# Patient Record
Sex: MALE | Race: BLACK OR AFRICAN AMERICAN | NOT HISPANIC OR LATINO | Employment: UNEMPLOYED | URBAN - METROPOLITAN AREA
[De-identification: names, ages, dates, MRNs, and addresses within clinical notes are randomized per-mention and may not be internally consistent; named-entity substitution may affect disease eponyms.]

---

## 2020-01-17 ENCOUNTER — HOSPITAL ENCOUNTER (EMERGENCY)
Facility: HOSPITAL | Age: 30
Discharge: HOME | End: 2020-01-18
Admitting: EMERGENCY MEDICINE

## 2020-01-17 DIAGNOSIS — M79.661 PAIN IN RIGHT LOWER LEG: Primary | ICD-10-CM

## 2020-01-17 PROCEDURE — 99283 EMERGENCY DEPT VISIT LOW MDM: CPT

## 2020-01-17 PROCEDURE — 80048 BASIC METABOLIC PNL TOTAL CA: CPT | Performed by: PHYSICIAN ASSISTANT

## 2020-01-17 PROCEDURE — 82550 ASSAY OF CK (CPK): CPT | Performed by: PHYSICIAN ASSISTANT

## 2020-01-17 RX ORDER — ACETAMINOPHEN 500 MG
1000 TABLET ORAL ONCE
Status: COMPLETED | OUTPATIENT
Start: 2020-01-17 | End: 2020-01-17

## 2020-01-17 RX ADMIN — ACETAMINOPHEN 1000 MG: 500 TABLET ORAL at 23:57

## 2020-01-18 VITALS
WEIGHT: 143.3 LBS | HEIGHT: 72 IN | SYSTOLIC BLOOD PRESSURE: 127 MMHG | HEART RATE: 74 BPM | OXYGEN SATURATION: 98 % | RESPIRATION RATE: 18 BRPM | BODY MASS INDEX: 19.41 KG/M2 | TEMPERATURE: 98.1 F | DIASTOLIC BLOOD PRESSURE: 72 MMHG

## 2020-01-18 LAB
ANION GAP SERPL CALCULATED.3IONS-SCNC: 10 MMOL/L (ref 5–15)
BUN BLD-MCNC: 13 MG/DL (ref 6–20)
BUN/CREAT SERPL: 9.6 (ref 7–25)
CALCIUM SPEC-SCNC: 9.1 MG/DL (ref 8.6–10.5)
CHLORIDE SERPL-SCNC: 106 MMOL/L (ref 98–107)
CK SERPL-CCNC: 369 U/L (ref 20–200)
CO2 SERPL-SCNC: 25 MMOL/L (ref 22–29)
CREAT BLD-MCNC: 1.36 MG/DL (ref 0.76–1.27)
GFR SERPL CREATININE-BSD FRML MDRD: 75 ML/MIN/1.73
GLUCOSE BLD-MCNC: 96 MG/DL (ref 65–99)
POTASSIUM BLD-SCNC: 3.5 MMOL/L (ref 3.5–5.2)
SODIUM BLD-SCNC: 141 MMOL/L (ref 136–145)

## 2020-01-18 NOTE — ED PROVIDER NOTES
Subjective   Patient is a 29-year-old -American male with no past medical history presents ER complaining of right lower leg pain for 1 day.  Patient states that he was installing a dryer at his house when he was standing to close the dryer outlet and felt a shock on his right lower leg.  Patient states that he did not have any pain at the time or throughout the day, however as the day persisted he started to feel some pain and intermittent numbness in his right lower leg.  Patient states that when he is walking his leg does not hurt him, however when he goes to kneel on the ground he starts feeling some numbness.  Patient states pain is mostly in right lower leg just below his right knee, described as a achy pain, occasionally radiates up to his knee and rates it a 3/10.  Patient denies any weakness, dropfoot, numbness and tingling in his toes.  Patient states he has taken no medicine to help with his pain today.      History provided by:  Patient      Review of Systems   Constitutional: Negative for fever.   HENT: Negative for sore throat and trouble swallowing.    Respiratory: Negative for shortness of breath and wheezing.    Cardiovascular: Negative for chest pain.   Gastrointestinal: Negative for abdominal pain.   Genitourinary: Negative for dysuria.   Musculoskeletal: Positive for arthralgias.        Right lower leg pain   Skin: Negative for rash.   Neurological: Positive for numbness. Negative for headaches.   Psychiatric/Behavioral: Negative for behavioral problems.   All other systems reviewed and are negative.      No past medical history on file.    No Known Allergies    No past surgical history on file.    No family history on file.    Social History     Socioeconomic History   • Marital status: Single     Spouse name: Not on file   • Number of children: Not on file   • Years of education: Not on file   • Highest education level: Not on file           Objective   Physical Exam   Constitutional: He  "is oriented to person, place, and time. He appears well-developed and well-nourished. No distress.   HENT:   Head: Normocephalic and atraumatic.   Eyes: Pupils are equal, round, and reactive to light. EOM are normal.   Cardiovascular: Normal rate, regular rhythm, normal heart sounds and intact distal pulses.   No murmur heard.  Pulmonary/Chest: Effort normal and breath sounds normal. He has no wheezes.   Musculoskeletal: Normal range of motion. He exhibits tenderness. He exhibits no edema or deformity.   Mild tenderness to palpation to the superior lateral aspect of the right lower leg  Sensation to soft touch intact  Sensory and motor strength 5/5 bilaterally  DP pulses present 2+ bilaterally  Negative Homans sign   Neurological: He is alert and oriented to person, place, and time. No sensory deficit. He exhibits normal muscle tone.   Skin: Skin is warm. Capillary refill takes less than 2 seconds. No rash noted. No erythema.   No overlying erythema, laceration, abrasion, burn, wound or break in the skin along the right lower leg  No signs of cellulitis, no ecchymosis, no streaking or signs of infection  No obvious swelling   Psychiatric: He has a normal mood and affect. His behavior is normal. Judgment and thought content normal.   Nursing note and vitals reviewed.      Procedures           ED Course    /72   Pulse 77   Temp 99 °F (37.2 °C) (Oral)   Resp 14   Ht 182.9 cm (72\")   Wt 65 kg (143 lb 4.8 oz)   SpO2 98%   BMI 19.43 kg/m²   Labs Reviewed   CK - Abnormal; Notable for the following components:       Result Value    Creatine Kinase 369 (*)     All other components within normal limits   BASIC METABOLIC PANEL - Abnormal; Notable for the following components:    Creatinine 1.36 (*)     All other components within normal limits    Narrative:     GFR Normal >60  Chronic Kidney Disease <60  Kidney Failure <15       Medications   acetaminophen (TYLENOL) tablet 1,000 mg (1,000 mg Oral Given 1/17/20 " 3919)     No radiology results for the last day                                             MDM  Number of Diagnoses or Management Options  Pain in right lower leg:   Diagnosis management comments: Patient was seen and evaluated by myself in the emergency room.  Patient had lab work obtained.  Patient's lab work unremarkable.  BMP was essentially normal with exception of creatinine at 1.36, CK slightly elevated at 369.  Patient was given Tylenol 1000 mg for pain.  On reevaluation patient reports improvement in his pain.  CK is noted to be slightly elevated, however CK usually elevated 4 times upper limit of normal for rhabdomyolysis, this is unlikely at this time.  On review of patient's chart patient's creatinine 6 months ago noted to be 1.4 compared to his creatinine of 1.36.  This is stable from 6 months ago.    Patient is recommended to use ibuprofen or Tylenol at home as needed for pain and to observe for any changes, increased swelling, redness, drainage, red streaking or other signs of infection.  Recommended ice at home in 20-minute increments every 2-3 hours while awake.  Patient is to elevate his right leg as well for comfort and to prevent significant edema.    Discharge plan and instructions were discussed with the patient who verbalized understanding and is in agreement with the plan, all questions were answered at this time.  Patient is aware of signs symptoms that would require immediate return to the emergency room.  Patient understands importance of following up with primary care provider for further evaluation and worsening concerns as well as blood pressure recheck in the next 4 weeks.    Patient remained afebrile, nontoxic-appearing, no acute respiratory distress throughout entire emergency room stay.  Patient was discharged in improved stable condition with an upright steady gait.       Amount and/or Complexity of Data Reviewed  Clinical lab tests: reviewed and ordered    Patient  Progress  Patient progress: improved      Final diagnoses:   Pain in right lower leg            Kellen Jones PA  01/18/20 0042

## 2020-01-18 NOTE — DISCHARGE INSTRUCTIONS
Take ibuprofen or Tylenol as needed for pain.  Do not mix ibuprofen with Advil, Aleve, Motrin, diclofenac or naproxen.  Apply ice to lower leg as needed for pain or swelling, apply in 20-minute increments every 2-3 hours while awake.  Elevate right leg when at home to help with swelling.    Follow-up with primary care as needed for new or worsening concerns as well as blood pressure check in the next 4 weeks.  Return to the ER for new or worsening symptoms, increased  Her leg pain, swelling, redness, wound, bleeding, drainage or other signs of infection.

## 2021-03-05 ENCOUNTER — HOSPITAL ENCOUNTER (EMERGENCY)
Facility: HOSPITAL | Age: 31
Discharge: HOME | End: 2021-03-05
Admitting: EMERGENCY MEDICINE

## 2021-03-05 ENCOUNTER — APPOINTMENT (OUTPATIENT)
Dept: GENERAL RADIOLOGY | Facility: HOSPITAL | Age: 31
End: 2021-03-05

## 2021-03-05 VITALS
HEART RATE: 57 BPM | WEIGHT: 146 LBS | OXYGEN SATURATION: 98 % | HEIGHT: 66 IN | DIASTOLIC BLOOD PRESSURE: 74 MMHG | BODY MASS INDEX: 23.46 KG/M2 | SYSTOLIC BLOOD PRESSURE: 122 MMHG | RESPIRATION RATE: 16 BRPM | TEMPERATURE: 98.2 F

## 2021-03-05 DIAGNOSIS — S33.5XXA LUMBAR SPRAIN, INITIAL ENCOUNTER: ICD-10-CM
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
## 2021-03-05 DIAGNOSIS — S29.011A MUSCLE STRAIN OF CHEST WALL, INITIAL ENCOUNTER: ICD-10-CM

## 2021-03-05 PROCEDURE — 99283 EMERGENCY DEPT VISIT LOW MDM: CPT

## 2021-03-05 PROCEDURE — 71046 X-RAY EXAM CHEST 2 VIEWS: CPT

## 2021-03-05 PROCEDURE — 72110 X-RAY EXAM L-2 SPINE 4/>VWS: CPT

## 2021-03-05 RX ORDER — HYDROCODONE BITARTRATE AND ACETAMINOPHEN 5; 325 MG/1; MG/1
1 TABLET ORAL ONCE
Status: COMPLETED | OUTPATIENT
Start: 2021-03-05 | End: 2021-03-05

## 2021-03-05 RX ORDER — METHOCARBAMOL 500 MG/1
500 TABLET, FILM COATED ORAL ONCE
Status: COMPLETED | OUTPATIENT
Start: 2021-03-05 | End: 2021-03-05

## 2021-03-05 RX ORDER — METHOCARBAMOL 500 MG/1
500 TABLET, FILM COATED ORAL 4 TIMES DAILY PRN
Qty: 20 TABLET | Refills: 0 | Status: SHIPPED | OUTPATIENT
Start: 2021-03-05

## 2021-03-05 RX ORDER — DICLOFENAC SODIUM 75 MG/1
75 TABLET, DELAYED RELEASE ORAL 2 TIMES DAILY PRN
Qty: 20 TABLET | Refills: 0 | Status: SHIPPED | OUTPATIENT
Start: 2021-03-05

## 2021-03-05 RX ADMIN — METHOCARBAMOL 500 MG: 500 TABLET, FILM COATED ORAL at 18:55

## 2021-03-05 RX ADMIN — HYDROCODONE BITARTRATE AND ACETAMINOPHEN 1 TABLET: 5; 325 TABLET ORAL at 18:55
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
## 2021-11-23 ENCOUNTER — APPOINTMENT (OUTPATIENT)
Dept: CT IMAGING | Facility: HOSPITAL | Age: 31
End: 2021-11-23

## 2021-11-23 ENCOUNTER — HOSPITAL ENCOUNTER (EMERGENCY)
Facility: HOSPITAL | Age: 31
Discharge: HOME | End: 2021-11-23
Attending: EMERGENCY MEDICINE | Admitting: EMERGENCY MEDICINE

## 2021-11-23 VITALS
RESPIRATION RATE: 17 BRPM | SYSTOLIC BLOOD PRESSURE: 126 MMHG | HEIGHT: 65 IN | BODY MASS INDEX: 23.32 KG/M2 | OXYGEN SATURATION: 99 % | TEMPERATURE: 98.6 F | WEIGHT: 140 LBS | DIASTOLIC BLOOD PRESSURE: 70 MMHG | HEART RATE: 76 BPM

## 2021-11-23 DIAGNOSIS — R20.2 PARESTHESIAS: Primary | ICD-10-CM

## 2021-11-23 LAB
AMPHET+METHAMPHET UR QL: NEGATIVE
ANION GAP SERPL CALCULATED.3IONS-SCNC: 13 MMOL/L (ref 5–15)
BARBITURATES UR QL SCN: NEGATIVE
BASOPHILS # BLD AUTO: 0 10*3/MM3 (ref 0–0.2)
BASOPHILS NFR BLD AUTO: 0.7 % (ref 0–1.5)
BENZODIAZ UR QL SCN: NEGATIVE
BUN SERPL-MCNC: 5 MG/DL (ref 6–20)
BUN/CREAT SERPL: 4.5 (ref 7–25)
CALCIUM SPEC-SCNC: 9.1 MG/DL (ref 8.6–10.5)
CANNABINOIDS SERPL QL: NEGATIVE
CHLORIDE SERPL-SCNC: 103 MMOL/L (ref 98–107)
CO2 SERPL-SCNC: 25 MMOL/L (ref 22–29)
COCAINE UR QL: NEGATIVE
CREAT SERPL-MCNC: 1.11 MG/DL (ref 0.76–1.27)
DEPRECATED RDW RBC AUTO: 43.8 FL (ref 37–54)
EOSINOPHIL # BLD AUTO: 0.4 10*3/MM3 (ref 0–0.4)
EOSINOPHIL NFR BLD AUTO: 10.9 % (ref 0.3–6.2)
ERYTHROCYTE [DISTWIDTH] IN BLOOD BY AUTOMATED COUNT: 15 % (ref 12.3–15.4)
ETHANOL UR QL: <0.01 %
GFR SERPL CREATININE-BSD FRML MDRD: 94 ML/MIN/1.73
GIANT PLATELETS: NORMAL
GLUCOSE BLDC GLUCOMTR-MCNC: 100 MG/DL (ref 70–105)
GLUCOSE SERPL-MCNC: 101 MG/DL (ref 65–99)
HCT VFR BLD AUTO: 46.1 % (ref 37.5–51)
HGB BLD-MCNC: 15.2 G/DL (ref 13–17.7)
HOLD SPECIMEN: NORMAL
HOLD SPECIMEN: NORMAL
LYMPHOCYTES # BLD AUTO: 1.3 10*3/MM3 (ref 0.7–3.1)
LYMPHOCYTES NFR BLD AUTO: 31.8 % (ref 19.6–45.3)
MCH RBC QN AUTO: 28 PG (ref 26.6–33)
MCHC RBC AUTO-ENTMCNC: 33 G/DL (ref 31.5–35.7)
MCV RBC AUTO: 84.8 FL (ref 79–97)
METHADONE UR QL SCN: NEGATIVE
MONOCYTES # BLD AUTO: 0.3 10*3/MM3 (ref 0.1–0.9)
MONOCYTES NFR BLD AUTO: 8 % (ref 5–12)
NEUTROPHILS NFR BLD AUTO: 2 10*3/MM3 (ref 1.7–7)
NEUTROPHILS NFR BLD AUTO: 48.6 % (ref 42.7–76)
NRBC BLD AUTO-RTO: 0.5 /100 WBC (ref 0–0.2)
OPIATES UR QL: NEGATIVE
OXYCODONE UR QL SCN: NEGATIVE
PLATELET # BLD AUTO: 172 10*3/MM3 (ref 140–450)
PMV BLD AUTO: 10.4 FL (ref 6–12)
POTASSIUM SERPL-SCNC: 3.3 MMOL/L (ref 3.5–5.2)
RBC # BLD AUTO: 5.44 10*6/MM3 (ref 4.14–5.8)
RBC MORPH BLD: NORMAL
SODIUM SERPL-SCNC: 141 MMOL/L (ref 136–145)
WBC MORPH BLD: NORMAL
WBC NRBC COR # BLD: 4 10*3/MM3 (ref 3.4–10.8)
WHOLE BLOOD HOLD SPECIMEN: NORMAL

## 2021-11-23 PROCEDURE — 80048 BASIC METABOLIC PNL TOTAL CA: CPT | Performed by: EMERGENCY MEDICINE

## 2021-11-23 PROCEDURE — 80307 DRUG TEST PRSMV CHEM ANLYZR: CPT | Performed by: EMERGENCY MEDICINE

## 2021-11-23 PROCEDURE — 85007 BL SMEAR W/DIFF WBC COUNT: CPT | Performed by: EMERGENCY MEDICINE

## 2021-11-23 PROCEDURE — 93005 ELECTROCARDIOGRAM TRACING: CPT | Performed by: EMERGENCY MEDICINE

## 2021-11-23 PROCEDURE — 99283 EMERGENCY DEPT VISIT LOW MDM: CPT

## 2021-11-23 PROCEDURE — 82962 GLUCOSE BLOOD TEST: CPT

## 2021-11-23 PROCEDURE — 70450 CT HEAD/BRAIN W/O DYE: CPT

## 2021-11-23 PROCEDURE — 82077 ASSAY SPEC XCP UR&BREATH IA: CPT | Performed by: EMERGENCY MEDICINE

## 2021-11-23 PROCEDURE — 85025 COMPLETE CBC W/AUTO DIFF WBC: CPT | Performed by: EMERGENCY MEDICINE

## 2021-11-23 PROCEDURE — 36415 COLL VENOUS BLD VENIPUNCTURE: CPT

## 2021-11-23 RX ORDER — SODIUM CHLORIDE 0.9 % (FLUSH) 0.9 %
10 SYRINGE (ML) INJECTION AS NEEDED
Status: DISCONTINUED | OUTPATIENT
Start: 2021-11-23 | End: 2021-11-23 | Stop reason: HOSPADM

## 2021-11-23 NOTE — ED PROVIDER NOTES
"Subjective   History of Present Illness  Numbness  31-year-old male states he woke up this morning around 8 AM and noticed some numbness in his left arm and left leg.  He points to a patch on the posterior aspect of his left elbow and around his left ankle as the areas of numbness.  The rest of the upper and lower limb feels normal to him.  He reports no weakness or difficulty walking or gripping.  He denies headache or blurry vision or speech difficulties.  He states he thinks he may have been laying on his left side at the time.  States he went to bed around 3 AM this morning.  Review of Systems   Constitutional: Negative.    HENT: Negative.    Eyes: Negative.    Respiratory: Negative.  Negative for chest tightness and shortness of breath.    Cardiovascular: Negative.  Negative for chest pain and palpitations.   Gastrointestinal: Negative.    Genitourinary: Negative.    Musculoskeletal: Negative.    Skin: Negative.    Neurological: Positive for numbness. Negative for dizziness, seizures, syncope, facial asymmetry, speech difficulty, weakness, light-headedness and headaches.   Psychiatric/Behavioral: Negative.        No past medical history on file.  Reportedly negative  No Known Allergies    No past surgical history on file.    No family history on file.    Social History     Socioeconomic History   • Marital status: Single     Prior to Admission medications    Medication Sig Start Date End Date Taking? Authorizing Provider   diclofenac (VOLTAREN) 75 MG EC tablet Take 1 tablet by mouth 2 (Two) Times a Day As Needed (paiin). 3/5/21   Renay Miranda APRN   methocarbamol (ROBAXIN) 500 MG tablet Take 1 tablet by mouth 4 (Four) Times a Day As Needed for Muscle Spasms. 3/5/21   Renay Miranda APRN     /69 (BP Location: Right arm, Patient Position: Lying)   Pulse 63   Temp 97.8 °F (36.6 °C) (Oral)   Resp 22   Ht 165.1 cm (65\")   Wt 63.5 kg (140 lb)   SpO2 99%   BMI 23.30 kg/m²   I examined the " patient using the appropriate personal protective equipment.          Objective   Physical Exam  General: Well-developed well-appearing, no acute distress, alert and appropriate  Eyes: Pupils round and reactive, sclera nonicteric  HEENT: Mucous membranes moist, no mucosal swelling, normocephalic, atraumatic  Neck: Supple, no nuchal rigidity, no soft tissue swelling  Respirations: Respirations nonlabored, equal breath sounds bilaterally, clear lungs  Heart regular rate and rhythm, no murmurs rubs or gallops,   Abdomen soft nontender nondistended, no hepatosplenomegaly, no hernia, no mass, normal bowel sounds, no CVA tenderness  Extremities no clubbing cyanosis or edema, calves are symmetric and nontender, normal pulses in the upper and lower extremities  Neuro cranial nerves II through XII intact , normal sensory/motor function and strength in four extremities, no slurred speech, no facial droop, normal finger to nose,  no nuchal rigidity  Psych oriented, pleasant affect  Skin no rash, brisk cap refill  Procedures           ED Course      Results for orders placed or performed during the hospital encounter of 11/23/21   Basic Metabolic Panel    Specimen: Blood   Result Value Ref Range    Glucose 101 (H) 65 - 99 mg/dL    BUN 5 (L) 6 - 20 mg/dL    Creatinine 1.11 0.76 - 1.27 mg/dL    Sodium 141 136 - 145 mmol/L    Potassium 3.3 (L) 3.5 - 5.2 mmol/L    Chloride 103 98 - 107 mmol/L    CO2 25.0 22.0 - 29.0 mmol/L    Calcium 9.1 8.6 - 10.5 mg/dL    eGFR  African Amer 94 >60 mL/min/1.73    BUN/Creatinine Ratio 4.5 (L) 7.0 - 25.0    Anion Gap 13.0 5.0 - 15.0 mmol/L   Ethanol    Specimen: Blood   Result Value Ref Range    Ethanol % <0.010 %   CBC Auto Differential    Specimen: Blood   Result Value Ref Range    WBC 4.00 3.40 - 10.80 10*3/mm3    RBC 5.44 4.14 - 5.80 10*6/mm3    Hemoglobin 15.2 13.0 - 17.7 g/dL    Hematocrit 46.1 37.5 - 51.0 %    MCV 84.8 79.0 - 97.0 fL    MCH 28.0 26.6 - 33.0 pg    MCHC 33.0 31.5 - 35.7 g/dL     RDW 15.0 12.3 - 15.4 %    RDW-SD 43.8 37.0 - 54.0 fl    MPV 10.4 6.0 - 12.0 fL    Platelets 172 140 - 450 10*3/mm3    Neutrophil % 48.6 42.7 - 76.0 %    Lymphocyte % 31.8 19.6 - 45.3 %    Monocyte % 8.0 5.0 - 12.0 %    Eosinophil % 10.9 (H) 0.3 - 6.2 %    Basophil % 0.7 0.0 - 1.5 %    Neutrophils, Absolute 2.00 1.70 - 7.00 10*3/mm3    Lymphocytes, Absolute 1.30 0.70 - 3.10 10*3/mm3    Monocytes, Absolute 0.30 0.10 - 0.90 10*3/mm3    Eosinophils, Absolute 0.40 0.00 - 0.40 10*3/mm3    Basophils, Absolute 0.00 0.00 - 0.20 10*3/mm3    nRBC 0.5 (H) 0.0 - 0.2 /100 WBC   Scan Slide    Specimen: Blood   Result Value Ref Range    RBC Morphology Normal Normal    WBC Morphology Normal Normal    Giant Platelets Slight/1+ None Seen   POC Glucose Once    Specimen: Blood   Result Value Ref Range    Glucose 100 70 - 105 mg/dL   ECG 12 Lead   Result Value Ref Range    QT Interval 379 ms   Green Top (Gel)   Result Value Ref Range    Extra Tube Hold for add-ons.    Gold Top - SST   Result Value Ref Range    Extra Tube Hold for add-ons.    Light Blue Top   Result Value Ref Range    Extra Tube hold for add-on      CT Head Without Contrast    Result Date: 11/23/2021  No acute intracranial abnormality identified. Brain MRI is more sensitive to evaluate for acute or subacute infarcts and to evaluate for intracranial metastatic disease.  Electronically Signed By-Flakito Negron MD On:11/23/2021 12:40 PM This report was finalized on [id number] by  Flakito Negron MD.         My EKG interpretation sinus rhythm early repolarization pattern, PAC, rate of 65                                  MDM  Patient has a normal neurologic exam with NIH of 0.  The tingling he states he was feeling this morning was in his posterior elbow and around his ankle.  Notably on that ankle he does have a home incarceration bracelet which could have contributed to some of the paresthesia.  CT scan of the head was negative laboratory evaluation essentially normal except  for some mild hypokalemia.  He was urged to eat a potassium rich food he is discharged in good condition with ongoing normal neurologic exam.  He states his symptoms are completely resolved.  He does not have findings or symptoms suggestive of CVA.  Final diagnoses:   Paresthesias       ED Disposition  ED Disposition     ED Disposition Condition Comment    Discharge Stable           Farideh Little, APRN  [address] [facility] [address]  [place] [address]  [phone]    Schedule an appointment as soon as possible for a visit in 1 week           Medication List      No changes were made to your prescriptions during this visit.          Gold Felix MD  11/23/21 6610

## 2021-11-23 NOTE — ED NOTES
Pt reports left sided numbness/weakness since 8am this morning.      Cj Coburn, RN  11/23/21 4773

## 2021-11-23 NOTE — DISCHARGE INSTRUCTIONS
Rest, drink plenty of fluids, eat a potassium rich food daily.  Return for increased numbness, weakness, blurry vision, speech difficulty or any other concerns

## 2021-11-25 LAB — QT INTERVAL: 379 MS
